# Patient Record
Sex: MALE | Race: WHITE | NOT HISPANIC OR LATINO | URBAN - METROPOLITAN AREA
[De-identification: names, ages, dates, MRNs, and addresses within clinical notes are randomized per-mention and may not be internally consistent; named-entity substitution may affect disease eponyms.]

---
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
## 2025-03-20 ENCOUNTER — APPOINTMENT (OUTPATIENT)
Dept: ALLERGY | Facility: CLINIC | Age: 20
End: 2025-03-20
Payer: COMMERCIAL

## 2025-04-07 ENCOUNTER — APPOINTMENT (OUTPATIENT)
Dept: ALLERGY | Facility: CLINIC | Age: 20
End: 2025-04-07
Payer: COMMERCIAL

## 2025-04-07 VITALS
HEART RATE: 106 BPM | WEIGHT: 200 LBS | SYSTOLIC BLOOD PRESSURE: 116 MMHG | TEMPERATURE: 97.7 F | DIASTOLIC BLOOD PRESSURE: 68 MMHG | BODY MASS INDEX: 30.31 KG/M2 | RESPIRATION RATE: 17 BRPM | OXYGEN SATURATION: 98 % | HEIGHT: 68 IN

## 2025-04-07 DIAGNOSIS — R21 RASH: ICD-10-CM

## 2025-04-07 DIAGNOSIS — J30.81 ALLERGIC RHINITIS DUE TO ANIMAL HAIR AND DANDER: ICD-10-CM

## 2025-04-07 DIAGNOSIS — J30.89 ALLERGIC RHINITIS DUE TO DUST MITE: ICD-10-CM

## 2025-04-07 DIAGNOSIS — J30.1 SEASONAL ALLERGIC RHINITIS DUE TO POLLEN: Primary | ICD-10-CM

## 2025-04-07 PROCEDURE — 99214 OFFICE O/P EST MOD 30 MIN: CPT | Performed by: ALLERGY & IMMUNOLOGY

## 2025-04-07 PROCEDURE — 3008F BODY MASS INDEX DOCD: CPT | Performed by: ALLERGY & IMMUNOLOGY

## 2025-04-07 ASSESSMENT — ENCOUNTER SYMPTOMS
RESPIRATORY NEGATIVE: 1
EYES NEGATIVE: 1
GASTROINTESTINAL NEGATIVE: 1
CARDIOVASCULAR NEGATIVE: 1
CONSTITUTIONAL NEGATIVE: 1
ENDOCRINE NEGATIVE: 1
MUSCULOSKELETAL NEGATIVE: 1

## 2025-04-07 NOTE — PATIENT INSTRUCTIONS
[facility]    [address]    [facility] [address]    Phone:  [phone]    Fax:  [phone]                                       After Visit Summary   4/28/2017    Magaly Jensen    MRN: [id number]           After Visit Summary Signature Page     I have received my discharge instructions, and my questions have been answered. I have discussed any challenges I see with this plan with the nurse or doctor.    ..........................................................................................................................................  Patient/Patient Representative Signature      ..........................................................................................................................................  Patient Representative Print Name and Relationship to Patient    ..................................................               ................................................  Date                                            Time    ..........................................................................................................................................  Reviewed by Signature/Title    ...................................................              ..............................................  Date                                                            Time           Keep an eye on the left arm rash. IF changes I would recommend you see a dermatology physician.    Start your daily allergy medications now including Cetirizine and Flonase.    To increase the efficacy of your intranasal steroid spray, be sure to look down while using it and point the bottle in the direction of the ear, not the middle part of your nose.  Give a slight sniff in after spraying and then repeat on the other side. If the spray goes to the middle part of the nose (the septum), there are increased risks of side effects such as bleeding from the nose.     For dust mite allergy, be sure to wash your bedding, including your sheets, weekly in hot water, in order to reduce dust mites.  Encase your pillow and mattress in a hypoallergenic or anti-dust mite case.      You may consider a HEPA filter if you do not have one, and if possible, put it in the patient's bedroom.      If you have pets, avoid having them in the bedroom if possible.  Regular grooming and wiped the pet with a pet allergy wipe will help with dander and allergens on the pet's fur.  Vacuum regularly.    Keep humidity in the home 30-50% to decrease mold growth.  Clean bathrooms with a bleach solution and fix any leaking faucets.  You may also wipe down windowsills with  bleach solution.    For pollen allergy, keep windows closed and use central air.  You can consider wearing a hat and sunglasses as well to reduce pollen exposure.  After being outside, wash hands and face or shower to reduce the pollen on your body.  Wash clothing after wearing outside during the pollen seasons.

## 2025-04-07 NOTE — PROGRESS NOTES
Patient ID: Vitaly El is a 19 y.o. male.     Chief Complaint: Follow up  Here with dad  History Of Present Illness  Vitaly El is a 19 y.o. male with PMx allergic rhinits presenting for follow up    Food Allergy  No    Eczema/ Atopic Dermatitis  No    Asthma  No    Rhinoconjunctivitis  Spring and fall flares  Has not yet started seasonal allergy medication. (Flonase and Zyrtec)    Drug Allergy   No    Insect Allergy   No    Infections  No history of frequent or recurrent infections      Review of Systems   Constitutional: Negative.    HENT: Negative.     Eyes: Negative.    Respiratory: Negative.     Cardiovascular: Negative.    Gastrointestinal: Negative.    Endocrine: Negative.    Musculoskeletal: Negative.    Skin: Negative.        Pertinent positives and negatives have been assessed in the HPI. All other systems have been reviewed and are negative except as noted in the HPI.    Allergies  Patient has no known allergies. 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    Family History  No family history on file.    Father with history of ar and ait    Surgical History  He has a past surgical history that includes Other surgical history (08/15/2015).    Social/Environmental History  He reports that he has never smoked. He has never used smokeless tobacco. No history on file for alcohol use and drug use.    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 90 tablet 3    fluticasone (Flonase) 50 mcg/actuation nasal spray Administer 2 sprays into each nostril once daily. Shake gently. Before first use, prime pump. After use, clean tip and replace cap. 16 g 11     No current facility-administered medications on file prior to visit.       Physical Exam  Visit Vitals  /68   Pulse 106   Temp 36.5 °C (97.7 °F) (Temporal)   Resp 17   Ht 1.727 m (5' 8\")   Wt 90.7 kg (200 lb)   SpO2 98%   BMI 30.41 kg/m²   Smoking Status Never   BSA 2.09 m²       Wt Readings from Last 1 Encounters:   04/07/25 90.7 kg (200 lb) (92%, Z= 1.43)*     * Growth percentiles are based on CDC (Boys, 2-20 Years) data.       Physical Exam    General: Well appearing, no acute distress  Head: Normocephalic, atraumatic, neck supple without lymphadenopathy  Eyes: EOMI, non-injected  EARS: tm's with normal visible landmarks  Nose: Nasal crease, nares congested with  boggy turbinates, minimal discharge  Throat: Normal dentition, no erythema, cobblestoning present  Heart: Regular rate and rhythm  Lungs: Clear to auscultation bilaterally, effort normal  Extremities: Moves all extremities symmetrically, no edema  Skin: Left antecube-flat irregular erythematous lesion, non pruritic.  Psych: normal mood and affect      Assessment/Plan   Vitaly is a 20 yo young man with allergy to dust mite, cat, tree, grass and weed pollen. Rash left antecube  -reviewed allergy avoidance and medications.  -monitor rash-derm is any changes or becomes bothersome    Gabriela Escamilla, DO    "

## 2025-08-26 ENCOUNTER — APPOINTMENT (OUTPATIENT)
Dept: PEDIATRICS | Facility: CLINIC | Age: 20
End: 2025-08-26
Payer: COMMERCIAL

## 2025-08-26 ENCOUNTER — OFFICE VISIT (OUTPATIENT)
Dept: PEDIATRICS | Facility: CLINIC | Age: 20
End: 2025-08-26
Payer: COMMERCIAL

## 2025-08-26 VITALS
WEIGHT: 202 LBS | HEIGHT: 69 IN | SYSTOLIC BLOOD PRESSURE: 115 MMHG | DIASTOLIC BLOOD PRESSURE: 65 MMHG | BODY MASS INDEX: 29.92 KG/M2 | HEART RATE: 123 BPM

## 2025-08-26 DIAGNOSIS — Z13.1 DIABETES MELLITUS SCREENING: ICD-10-CM

## 2025-08-26 DIAGNOSIS — Z13.220 LIPID SCREENING: ICD-10-CM

## 2025-08-26 DIAGNOSIS — Z11.3 ROUTINE SCREENING FOR STI (SEXUALLY TRANSMITTED INFECTION): ICD-10-CM

## 2025-08-26 DIAGNOSIS — Z00.00 WELL ADULT EXAM: Primary | ICD-10-CM

## 2025-08-26 DIAGNOSIS — F84.0 AUTISM (HHS-HCC): ICD-10-CM

## 2025-08-26 DIAGNOSIS — Z13.6 ENCOUNTER FOR SCREENING FOR CARDIOVASCULAR DISORDERS: ICD-10-CM

## 2025-08-26 DIAGNOSIS — H61.22 LEFT EAR IMPACTED CERUMEN: ICD-10-CM

## 2025-08-26 PROBLEM — Z86.59 HISTORY OF MENTAL DISORDER: Status: RESOLVED | Noted: 2025-08-26 | Resolved: 2025-08-26

## 2025-08-26 PROBLEM — E66.3 PEDIATRIC OVERWEIGHT: Status: RESOLVED | Noted: 2025-08-26 | Resolved: 2025-08-26

## 2025-08-26 PROBLEM — M79.646 PAIN OF FINGER: Status: RESOLVED | Noted: 2025-08-26 | Resolved: 2025-08-26

## 2025-08-26 PROCEDURE — 1036F TOBACCO NON-USER: CPT | Performed by: STUDENT IN AN ORGANIZED HEALTH CARE EDUCATION/TRAINING PROGRAM

## 2025-08-26 PROCEDURE — 3008F BODY MASS INDEX DOCD: CPT | Performed by: STUDENT IN AN ORGANIZED HEALTH CARE EDUCATION/TRAINING PROGRAM

## 2025-08-26 PROCEDURE — 99395 PREV VISIT EST AGE 18-39: CPT | Performed by: STUDENT IN AN ORGANIZED HEALTH CARE EDUCATION/TRAINING PROGRAM

## 2025-08-30 LAB
25(OH)D3+25(OH)D2 SERPL-MCNC: 23 NG/ML (ref 30–100)
ALT SERPL-CCNC: 36 U/L (ref 9–46)
CHOLEST SERPL-MCNC: 196 MG/DL
CHOLEST/HDLC SERPL: 4 (CALC)
EST. AVERAGE GLUCOSE BLD GHB EST-MCNC: 97 MG/DL
EST. AVERAGE GLUCOSE BLD GHB EST-SCNC: 5.4 MMOL/L
HBA1C MFR BLD: 5 %
HDLC SERPL-MCNC: 49 MG/DL
HIV 1+2 AB+HIV1 P24 AG SERPL QL IA: NORMAL
HIV 1+2 AB+HIV1 P24 AG SERPL QL IA: NORMAL
LDLC SERPL CALC-MCNC: 129 MG/DL (CALC)
NONHDLC SERPL-MCNC: 147 MG/DL (CALC)
T PALLIDUM AB SER QL IA: NORMAL
TRIGL SERPL-MCNC: 81 MG/DL

## 2025-09-03 ENCOUNTER — OFFICE VISIT (OUTPATIENT)
Dept: PEDIATRICS | Facility: CLINIC | Age: 20
End: 2025-09-03
Payer: COMMERCIAL

## 2025-09-03 VITALS — BODY MASS INDEX: 29.62 KG/M2 | HEIGHT: 69 IN | WEIGHT: 200 LBS | TEMPERATURE: 97.4 F

## 2025-09-03 DIAGNOSIS — H69.93 DYSFUNCTION OF BOTH EUSTACHIAN TUBES: Primary | ICD-10-CM

## 2025-09-03 LAB
25(OH)D3+25(OH)D2 SERPL-MCNC: 23 NG/ML (ref 30–100)
ALT SERPL-CCNC: 36 U/L (ref 9–46)
CHOLEST SERPL-MCNC: 196 MG/DL
CHOLEST/HDLC SERPL: 4 (CALC)
EST. AVERAGE GLUCOSE BLD GHB EST-MCNC: 97 MG/DL
EST. AVERAGE GLUCOSE BLD GHB EST-SCNC: 5.4 MMOL/L
HBA1C MFR BLD: 5 %
HDLC SERPL-MCNC: 49 MG/DL
HIV 1+2 AB+HIV1 P24 AG SERPL QL IA: NORMAL
HIV 1+2 AB+HIV1 P24 AG SERPL QL IA: NORMAL
LDLC SERPL CALC-MCNC: 129 MG/DL (CALC)
NONHDLC SERPL-MCNC: 147 MG/DL (CALC)
T PALLIDUM AB SER QL IA: NEGATIVE
TRIGL SERPL-MCNC: 81 MG/DL

## 2025-09-03 PROCEDURE — 99213 OFFICE O/P EST LOW 20 MIN: CPT | Performed by: NURSE PRACTITIONER

## 2025-09-03 PROCEDURE — 1036F TOBACCO NON-USER: CPT | Performed by: NURSE PRACTITIONER

## 2025-09-03 PROCEDURE — 3008F BODY MASS INDEX DOCD: CPT | Performed by: NURSE PRACTITIONER

## 2025-09-04 PROBLEM — E78.00 ELEVATED CHOLESTEROL: Status: ACTIVE | Noted: 2025-09-04
